# Patient Record
Sex: MALE | Race: WHITE | NOT HISPANIC OR LATINO | Employment: FULL TIME | URBAN - METROPOLITAN AREA
[De-identification: names, ages, dates, MRNs, and addresses within clinical notes are randomized per-mention and may not be internally consistent; named-entity substitution may affect disease eponyms.]

---

## 2019-01-02 ENCOUNTER — TRANSCRIBE ORDERS (OUTPATIENT)
Dept: RADIOLOGY | Facility: HOSPITAL | Age: 49
End: 2019-01-02

## 2019-01-02 ENCOUNTER — HOSPITAL ENCOUNTER (OUTPATIENT)
Dept: RADIOLOGY | Facility: HOSPITAL | Age: 49
Discharge: HOME | End: 2019-01-02
Attending: INTERNAL MEDICINE
Payer: COMMERCIAL

## 2019-01-02 ENCOUNTER — TELEPHONE (OUTPATIENT)
Dept: PHYSICAL THERAPY | Facility: OTHER | Age: 49
End: 2019-01-02

## 2019-01-02 ENCOUNTER — DOCUMENTATION (OUTPATIENT)
Dept: PHYSICAL THERAPY | Facility: OTHER | Age: 49
End: 2019-01-02

## 2019-01-02 ENCOUNTER — OFFICE VISIT (OUTPATIENT)
Dept: INTERNAL MEDICINE CLINIC | Facility: CLINIC | Age: 49
End: 2019-01-02
Payer: COMMERCIAL

## 2019-01-02 VITALS
HEIGHT: 74 IN | DIASTOLIC BLOOD PRESSURE: 86 MMHG | BODY MASS INDEX: 30.29 KG/M2 | HEART RATE: 88 BPM | OXYGEN SATURATION: 98 % | WEIGHT: 236 LBS | TEMPERATURE: 98.6 F | SYSTOLIC BLOOD PRESSURE: 122 MMHG

## 2019-01-02 DIAGNOSIS — Z23 NEED FOR TETANUS, DIPHTHERIA, AND ACELLULAR PERTUSSIS (TDAP) VACCINE: Primary | ICD-10-CM

## 2019-01-02 DIAGNOSIS — M54.41 ACUTE BILATERAL LOW BACK PAIN WITH BILATERAL SCIATICA: ICD-10-CM

## 2019-01-02 DIAGNOSIS — M54.42 ACUTE BILATERAL LOW BACK PAIN WITH BILATERAL SCIATICA: ICD-10-CM

## 2019-01-02 DIAGNOSIS — Z23 IMMUNIZATION DUE: ICD-10-CM

## 2019-01-02 PROBLEM — M54.40 ACUTE BILATERAL LOW BACK PAIN WITH SCIATICA: Status: ACTIVE | Noted: 2019-01-02

## 2019-01-02 PROCEDURE — 99213 OFFICE O/P EST LOW 20 MIN: CPT | Performed by: INTERNAL MEDICINE

## 2019-01-02 PROCEDURE — 1036F TOBACCO NON-USER: CPT | Performed by: INTERNAL MEDICINE

## 2019-01-02 PROCEDURE — 90471 IMMUNIZATION ADMIN: CPT

## 2019-01-02 PROCEDURE — 3008F BODY MASS INDEX DOCD: CPT | Performed by: INTERNAL MEDICINE

## 2019-01-02 PROCEDURE — 90715 TDAP VACCINE 7 YRS/> IM: CPT

## 2019-01-02 PROCEDURE — 72110 X-RAY EXAM L-2 SPINE 4/>VWS: CPT

## 2019-01-02 RX ORDER — GAUZE BANDAGE 4" X 4"
1 BANDAGE TOPICAL EVERY OTHER DAY
COMMUNITY

## 2019-01-02 RX ORDER — TRAMADOL HYDROCHLORIDE 50 MG/1
50 TABLET ORAL EVERY 8 HOURS PRN
Qty: 30 TABLET | Refills: 0 | Status: SHIPPED | OUTPATIENT
Start: 2019-01-02

## 2019-01-02 RX ORDER — ASPIRIN 325 MG
325 TABLET ORAL AS NEEDED
COMMUNITY

## 2019-01-02 RX ORDER — MULTIVITAMIN
1 TABLET ORAL DAILY
COMMUNITY

## 2019-01-02 RX ORDER — IBUPROFEN 200 MG
200 TABLET ORAL EVERY 6 HOURS PRN
COMMUNITY

## 2019-01-02 RX ORDER — OMEPRAZOLE 20 MG/1
20 CAPSULE, DELAYED RELEASE ORAL DAILY
Qty: 30 CAPSULE | Refills: 0 | Status: SHIPPED | OUTPATIENT
Start: 2019-01-02

## 2019-01-02 RX ORDER — CYCLOBENZAPRINE HCL 5 MG
5 TABLET ORAL 3 TIMES DAILY PRN
Qty: 30 TABLET | Refills: 0 | Status: SHIPPED | OUTPATIENT
Start: 2019-01-02

## 2019-01-02 NOTE — PROGRESS NOTES
Assessment/Plan:    Acute bilateral low back pain with sciatica  Plan will be to treat with cyclobenzaprine 5 mg every 8 hr as needed for muscle spasms as well as could continuation of NSAIDs however decreasing dose of ibuprofen to 800 mg every 6 hr for mild-to-moderate pain  Will also prescribe tramadol 50 mg every 8 hr for moderate to severe pain in  Will prescribe omeprazole 20 mg once a day while on high-dose ibuprofen/NSAIDs  Will get an x-ray of the lumbar back for further evaluation  Will also refer to comprehensive spine program     Immunization due  TDaP administered today  Diagnoses and all orders for this visit:    Need for tetanus, diphtheria, and acellular pertussis (Tdap) vaccine  -     TDAP VACCINE GREATER THAN OR EQUAL TO 8YO IM    Acute bilateral low back pain with bilateral sciatica  -     XR spine lumbar minimum 4 views non injury; Future  -     Ambulatory Referral to Comprehensive Spine Program; Future  -     Ambulatory Referral to Massage Therapist; Future  -     cyclobenzaprine (FLEXERIL) 5 mg tablet; Take 1 tablet (5 mg total) by mouth 3 (three) times a day as needed for muscle spasms Take 1  -     traMADol (ULTRAM) 50 mg tablet; Take 1 tablet (50 mg total) by mouth every 8 (eight) hours as needed for moderate pain or severe pain  -     omeprazole (PriLOSEC) 20 mg delayed release capsule; Take 1 capsule (20 mg total) by mouth daily    Immunization due    Other orders  -     aspirin 325 mg tablet; Take 325 mg by mouth as needed for mild pain  -     ibuprofen (MOTRIN) 200 mg tablet; Take 200 mg by mouth every 6 (six) hours as needed for mild pain  -     Multiple Vitamin (MULTIVITAMIN) tablet; Take 1 tablet by mouth daily  -     KRILL OIL PO; Take 1 capsule by mouth every other day  -     co-enzyme Q-10 30 MG capsule; Take 30 mg by mouth daily  -     b complex vitamins tablet; Take 1 tablet by mouth daily  -     Omega-3 Fatty Acids (FISH OIL) 435 MG CAPS;  Take 1 capsule by mouth every other day          Subjective:      Patient ID: Lokesh Ferro is a 46 y o  male  46year old male is seen today with acute complaint of low back pain 1-week ago  He hurt his back while helping a friend insulate his attic, bending over for prolonged period of time  He has hurt his back in the past with shoveling, denies any malalignment or fracture although vaguely recalls being told he had a disc herniation then  He has been taking high dose Ibuprofen for pain with mild relief  He has been taking 800-1000 mg once a day of ibuprofen, this morning took 1200 mg  He does state that he has been having a difficult time with ambulation since injury  He is requesting TDaP immunization as he has a new born child  Last TDaP longer than 10-years ago  Back Pain   This is a new problem  The current episode started in the past 7 days  The problem occurs constantly  The problem is unchanged  The pain is present in the lumbar spine  The quality of the pain is described as shooting, stabbing and burning  The pain radiates to the left thigh  The pain is severe  The pain is the same all the time  The symptoms are aggravated by bending, position and twisting  Stiffness is present all day  Pertinent negatives include no abdominal pain, bladder incontinence, bowel incontinence, chest pain, dysuria, fever, headaches, leg pain, numbness, paresis, paresthesias, pelvic pain, perianal numbness, tingling, weakness or weight loss  He has tried NSAIDs for the symptoms  The treatment provided mild relief  The following portions of the patient's history were reviewed and updated as appropriate: allergies, current medications, past family history, past medical history, past social history, past surgical history and problem list     Review of Systems   Constitutional: Negative for activity change, appetite change, chills, diaphoresis, fatigue, fever and weight loss     HENT: Negative for congestion, postnasal drip, rhinorrhea, sinus pain, sinus pressure, sneezing and sore throat  Eyes: Negative for visual disturbance  Respiratory: Negative for apnea, cough, choking, chest tightness, shortness of breath and wheezing  Cardiovascular: Negative for chest pain, palpitations and leg swelling  Gastrointestinal: Negative for abdominal distention, abdominal pain, anal bleeding, blood in stool, bowel incontinence, constipation, diarrhea, nausea and vomiting  Endocrine: Negative for cold intolerance and heat intolerance  Genitourinary: Negative for bladder incontinence, difficulty urinating, dysuria, hematuria and pelvic pain  Musculoskeletal: Positive for back pain  Skin: Negative  Neurological: Negative for dizziness, tingling, weakness, light-headedness, numbness, headaches and paresthesias  Hematological: Negative for adenopathy  Psychiatric/Behavioral: Negative for agitation, sleep disturbance and suicidal ideas  All other systems reviewed and are negative  History reviewed  No pertinent past medical history        Current Outpatient Prescriptions:     aspirin 325 mg tablet, Take 325 mg by mouth as needed for mild pain, Disp: , Rfl:     b complex vitamins tablet, Take 1 tablet by mouth daily, Disp: , Rfl:     co-enzyme Q-10 30 MG capsule, Take 30 mg by mouth daily, Disp: , Rfl:     ibuprofen (MOTRIN) 200 mg tablet, Take 200 mg by mouth every 6 (six) hours as needed for mild pain, Disp: , Rfl:     KRILL OIL PO, Take 1 capsule by mouth every other day, Disp: , Rfl:     Multiple Vitamin (MULTIVITAMIN) tablet, Take 1 tablet by mouth daily, Disp: , Rfl:     Omega-3 Fatty Acids (FISH OIL) 435 MG CAPS, Take 1 capsule by mouth every other day, Disp: , Rfl:     cyclobenzaprine (FLEXERIL) 5 mg tablet, Take 1 tablet (5 mg total) by mouth 3 (three) times a day as needed for muscle spasms Take 1, Disp: 30 tablet, Rfl: 0    omeprazole (PriLOSEC) 20 mg delayed release capsule, Take 1 capsule (20 mg total) by mouth daily, Disp: 30 capsule, Rfl: 0    traMADol (ULTRAM) 50 mg tablet, Take 1 tablet (50 mg total) by mouth every 8 (eight) hours as needed for moderate pain or severe pain, Disp: 30 tablet, Rfl: 0    No Known Allergies    Social History   History reviewed  No pertinent surgical history  History reviewed  No pertinent family history  Objective:  /86 (BP Location: Left arm, Patient Position: Sitting, Cuff Size: Large)   Pulse 88   Temp 98 6 °F (37 °C) (Oral)   Ht 6' 2" (1 88 m) Comment: with shoes on  Wt 107 kg (236 lb) Comment: with shoes on  SpO2 98% Comment: room air  BMI 30 30 kg/m²     No results found for this or any previous visit (from the past 1344 hour(s))  Physical Exam   Constitutional: He is oriented to person, place, and time  He appears well-developed and well-nourished  No distress  HENT:   Head: Normocephalic and atraumatic  Eyes: Pupils are equal, round, and reactive to light  Conjunctivae and EOM are normal  Right eye exhibits no discharge  Left eye exhibits no discharge  No scleral icterus  Neck: Normal range of motion  Neck supple  No JVD present  No thyromegaly present  Cardiovascular: Normal rate, regular rhythm, normal heart sounds and intact distal pulses  Exam reveals no gallop and no friction rub  No murmur heard  Pulmonary/Chest: Effort normal and breath sounds normal  No respiratory distress  He has no wheezes  He has no rales  He exhibits no tenderness  Abdominal: Soft  Bowel sounds are normal  He exhibits no distension and no mass  There is no tenderness  There is no rebound and no guarding  Musculoskeletal: He exhibits no edema or deformity  Lumbar back: He exhibits decreased range of motion, tenderness, bony tenderness, pain and spasm  He exhibits no swelling, no edema, no deformity, no laceration and normal pulse  Lymphadenopathy:     He has no cervical adenopathy  Neurological: He is alert and oriented to person, place, and time   He has normal reflexes  No cranial nerve deficit  Coordination normal    Skin: Skin is warm and dry  No rash noted  He is not diaphoretic  No erythema  No pallor  Psychiatric: He has a normal mood and affect  His behavior is normal  Judgment and thought content normal    Nursing note and vitals reviewed

## 2019-01-02 NOTE — TELEPHONE ENCOUNTER
Patient called in with regard to multiple referrals placed by PCP  Patient was not sure where he was supposed to be to for further care  Patient had multiple referrals placed with conflicting information  Era Arciniega RN had been in communication with PCP about referrals and was currently on another call  Patient requesting call from colleague  Patient states he lives half the week in [place] and half the week in [place]  Patient advised to contact PCP as well for clarification as he stated he thought he was being seen by pain management or a specialist, and did not want PT as he has done it multiple times

## 2019-01-02 NOTE — PROGRESS NOTES
This nurse did in-basket the provider for further clarification of referrals that were  placed for this Pt  Referral was closed  Waiting for clarification from provider

## 2019-01-02 NOTE — ASSESSMENT & PLAN NOTE
Plan will be to treat with cyclobenzaprine 5 mg every 8 hr as needed for muscle spasms as well as could continuation of NSAIDs however decreasing dose of ibuprofen to 800 mg every 6 hr for mild-to-moderate pain  Will also prescribe tramadol 50 mg every 8 hr for moderate to severe pain in  Will prescribe omeprazole 20 mg once a day while on high-dose ibuprofen/NSAIDs  Will get an x-ray of the lumbar back for further evaluation    Will also refer to comprehensive spine program

## 2019-01-02 NOTE — PROGRESS NOTES
This R N  did receive clarification from Dr Tawnya Rodney [facility]), that he will be correcting the referrals that were placed incorrectly   This nurse did provide the name of pain management doctor that is in the [place] office to Dr Tawnya Rodney, and was notified by Dr Tawnya Rodney that he is placing referral for Pt  see massage therapist

## 2019-01-03 ENCOUNTER — DOCUMENTATION (OUTPATIENT)
Dept: PHYSICAL THERAPY | Facility: OTHER | Age: 49
End: 2019-01-03

## 2019-01-03 NOTE — PROGRESS NOTES
R N  did attempt to reach Pt  and left v m  With our phone number and our hours   Waiting for return phone call

## 2019-01-09 ENCOUNTER — TELEPHONE (OUTPATIENT)
Dept: INTERNAL MEDICINE CLINIC | Facility: CLINIC | Age: 49
End: 2019-01-09

## 2019-01-21 ENCOUNTER — TELEPHONE (OUTPATIENT)
Dept: OTHER | Facility: HOSPITAL | Age: 49
End: 2019-01-21

## 2020-07-17 ENCOUNTER — APPOINTMENT (OUTPATIENT)
Dept: RADIOLOGY | Facility: AMBULARY SURGERY CENTER | Age: 50
End: 2020-07-17
Attending: ORTHOPAEDIC SURGERY
Payer: COMMERCIAL

## 2020-07-17 VITALS — BODY MASS INDEX: 30.3 KG/M2 | HEIGHT: 74 IN

## 2020-07-17 DIAGNOSIS — M25.572 PAIN, JOINT, ANKLE AND FOOT, LEFT: ICD-10-CM

## 2020-07-17 DIAGNOSIS — S93.492A SPRAIN OF ANTERIOR TALOFIBULAR LIGAMENT OF LEFT ANKLE, INITIAL ENCOUNTER: Primary | ICD-10-CM

## 2020-07-17 PROCEDURE — 1036F TOBACCO NON-USER: CPT | Performed by: ORTHOPAEDIC SURGERY

## 2020-07-17 PROCEDURE — 99203 OFFICE O/P NEW LOW 30 MIN: CPT | Performed by: ORTHOPAEDIC SURGERY

## 2020-07-17 PROCEDURE — 73610 X-RAY EXAM OF ANKLE: CPT

## 2020-07-17 NOTE — TELEPHONE ENCOUNTER
We want him out of the boot into a supportive sneaker by Sunday 7/26  He may wean out of it sooner than that if he starts feeling better with PT, RICE, and activity modification

## 2020-07-17 NOTE — TELEPHONE ENCOUNTER
Spoke to patient, advised that he needs to be wearing the boot anytime he is weight bearing for now but should be weaned out of it by 7/26  Advised compression with an ACE bandage the same way  Advised ice 20 mins on 20 mins off and elevation as well  Advised that he may remove the boot and wrap to sleep  Patient verbalized understanding  He wanted me to let you know that he feels much better with compression of the ace bandage and boot application   He thanks you

## 2020-07-17 NOTE — TELEPHONE ENCOUNTER
Patient is calling to find out how long he is supposed to wear his "space boot"  Patient is stating that it is unclear just that he is only to wear it for 10 days  Patient would like someone to call him back today so he knows how he is suppose to wear this for the weekend       [phone]

## 2020-07-17 NOTE — PATIENT INSTRUCTIONS
You have sprained your ankle  Over the next 4 weeks it is important you follow these instructions; Lateral Ankle Sprain Protocol - [facility]  Acute Phase: Days 1-3  Goals: Decrease pain and swelling, protect from further injury  · Pain and swelling management (RICE)  · Protection of injured ligaments (taping, splints, casting, walking boot etc )  · Gait-WBAT  Sub-Acute Phase: 2-4 days to 2 weeks  Goals: Decrease/eliminate pain, increase ROM, decrease swelling, increase strength  · Continue pain and swelling management  · Subtalar and talocrurcal joint mobilizations  · ROM with pain-free range: DF/PF/EV/IV AROM, calf stretching  · Increase weight bearing of affected extremity during gait  · Isometric strengthening  Rehabilitative Phase: 2-6 weeks  Goals: Regain ROM and strength, increase endurance and proprioception  · Continue joint mobilizations and stretching  · Progress to pain-free concentric and eccentric strengthening exercises (both open chain and closed chain)  · Proprioception exercises (balance board, BAPS board, single leg stance etc )  · Gait training-promote equal weight beraing and weaning of assistive devices  · Endurance activities (stationary biking, swimming, walking, etc )  Functional Phase: 6 weeks  Goals: Return to full activity and function  · Continue strengthening exercises  · Coordination and agility training-depends on patient's prior level of function, recreational activities, and goals         Treating your Sprained Ankle  Treating your sprained ankle properly may prevent chronic pain and instability  For a Grade I sprain, follow the R I C E  guidelines:    · Rest your ankle by not walking on it  Limit weight bearing  Use crutches if necessary; if there is no fracture you are safe to put some weight on the leg  An ankle brace often helps control swelling and adds stability while the ligaments are healing  · Ice it to keep down the swelling   Don't put ice directly on the skin (use a thin piece of cloth such as a pillow case between the ice bag and the skin) and don't ice more than 20 minutes at a time to avoid frost bite  · Compression can help control swelling as well as immobilize and support your injury  · Elevate the foot by reclining and propping it up above the waist or heart as needed     Swelling usually goes down with a few days  For a Grade II sprain, follow the R I C E  guidelines and allow more time for healing  A doctor may immobilize or splint your sprained ankle  A Grade III sprain puts you at risk for permanent ankle instability  Rarely, surgery may be needed to repair the damage, especially in competitive athletes  For severe ankle sprains, your doctor may also consider treating you with a short leg cast for two to three weeks or a walking boot  People who sprain their ankle repeatedly may also need surgical repair to tighten their ligaments  Rehabilitating your Sprained Ankle  Every ligament injury needs rehabilitation  Otherwise, your sprained ankle might not heal completely and you might re-injure it  All ankle sprains, from mild to severe, require three phases of recovery:    · Phase I includes resting, protecting and reducing swelling of your injured ankle  · Phase II includes restoring your ankle's flexibility, range of motion and strength  · Phase III includes gradually returning to straight-ahead activity and doing maintenance exercises, followed later by more cutting sports such as tennis, basketball or football    Once you can stand on your ankle again, your doctor will prescribe exercise routines to strengthen your muscles and ligaments and increase your flexibility, balance and coordination  Later, you may walk, jog and run figure eights with your ankle taped or in a supportive ankle brace  It's important to complete the rehabilitation program because it makes it less likely that you'll hurt the same ankle again   If you don't complete rehabilitation, you could suffer chronic pain, instability and arthritis in your ankle  If your ankle still hurts, it could mean that the sprained ligament has not healed right, or that some other injury also happened  To prevent future sprained ankles, pay attention to your body's warning signs to slow down when you feel pain or fatigue, and stay in shape with good muscle balance, flexibility and strength in your soft tissues  Ankle Sprain     What is an ankle sprain? An ankle sprain refers to tearing of the ligaments of the ankle  The most common ankle sprain occurs on the lateral or outside part of the ankle  This is an extremely common injury which affects many people during a wide variety of activities  It can happen in the setting of an ankle fracture (i e  when the bones of the ankle also break)  Most commonly, however, it occurs in isolation  What are the symptoms an ankle sprain? Patients report pain after having twisted an ankle  This usually occurs due to an inversion injury, which means the foot rolls underneath the ankle or leg  It commonly occurs during sports  Patients will complain of pain on the outside of their ankle and various degrees of swelling and bleeding under the skin (i e  bruising)  Technically, this bruising is referred to as ecchymosis  Depending on the severity of the sprain, a person may or may not be able to put weight on the foot     What are the risk factors for an ankle sprain? As noted above, these injuries occur when the ankle is twisted underneath the leg, called inversion  Risk factors are those activities, such as basketball and jumping sports, in which an athlete can come down on and turn the ankle or step on an opponent's foot      Some people are predisposed to ankle sprains  In people with a hindfoot varus, which means that the general nature or posture of the heels is slightly turned toward the inside, these injuries are more common   This is because it is easier to turn on the ankle      In those who have had a severe sprain in the past, it is also easier to turn the ankle and cause a new sprain  Therefore, one of the risk factors of spraining the ankle is having instability  Those who have weak muscles, especially those called the peroneals which run along the outside of the ankle, may be more predisposed    Anatomy    There are multiple ligaments in the ankle  Ligaments in general are those structures that connect bone-to-bone  Tendons, on the other hand, connect muscle-to-bone and allow those muscles to exert their force  In the case of an ankle sprain, there are several commonly sprained ligaments  The two most important are the following:            ?  ????       1  The ATFL or anterior talofibular ligament, which connects the talus to the fibula on the outside of the ankle       2  The CFL or calcaneal fibular ligament, which connects the fibula to the calcaneus below        3  Finally, there is a third ligament which is not as commonly torn  It runs more in the back of the ankle and is called the PTFL or posterior talofibular ligament  These must be differentiated from the so-called high ankle sprain ligaments, which are completely different and located higher up the leg  How is an ankle sprain diagnosed? Ankle sprains can be diagnosed fairly easily given that they are common injuries  The location of pain on the outside of the ankle with tenderness and swelling in a patient who has an ankle with inversion is very suggestive  In these patients, normal X-rays also suggest that the bone has not been broken and instead the ankle ligaments have been torn or sprained      It is very important, however, not to simply regard any injury as an ankle sprain because other injuries can occur as well  For example, the peroneal tendons mentioned above can be torn   There can also be fractures in other bones around the ankle including the fifth metatarsal and the anterior process of the calcaneus  In very severe cases, an MRI may be warranted to rule out other problems in the ankle such as damage to the cartilage  An MRI typically is not necessary to diagnose a sprain     What are treatment options? Surgery is not required in the vast majority of ankle sprains  Even in severe sprains, these ligaments will heal without surgery  The grade of the sprain will dictate treatment  Sprains are traditionally classified into several grades  Perhaps more important, however, is the patients ability to bear weight  Those that can bear weight even after the injury are likely to return very quickly to play  Those who cannot walk may need to be immobilized      In general, treatment in the first 48 to 72 hours consists of resting the ankle, icing 20 minutes every two to three hours, compressing with an ACE wrap, and elevating, which means positioning the leg and ankle so that the toes are above the level of patients nose  Those patients who cannot bear weight are better treated in a removable walking boot until they can comfortably bear weight      Physical therapy is a mainstay  Patients should learn to strengthen the muscles around the ankle, particularly the peroneals  An ankle brace can be used in an athlete until a therapist believes that the ankle is strong enough to return to play without it  Surgery is rarely indicated but may be needed in a patient who has cartilage damage or other related injuries  Ligaments are only repaired or strengthened in cases of chronic instability in which the ligaments have healed but not in a strong fashion  How long is recovery? Recovery depends on the severity of the injury  As noted above, for those minor injuries, people can return to their activities in sports within several days  For very severe sprains, it may take longer and up to several weeks   It should be noted that high ankle sprains take considerably longer to heal      Outcomes for ankle sprains are generally quite good  Most patients heal from an ankle sprain and are able to get back to their normal lives, sports and activities  Some people, however, who do not properly rehab their ankle and have a rather severe sprain may go on to have ankle instability  Chronic instability occurs in patients repeatedly spraining the ankle  Such repeated episodes can be dangerous because they can lead to damage within the ankle  These patients should be identified and considered for repair     Potential Complications    Surgery is rarely needed  As noted above, however, an improperly rehabbed ankle may end up having chronic instability  It is important to address this with either therapy or surgery before further damage occurs to the ankle  Frequently Asked Questions    What is a high ankle sprain and is that different from a regular ankle sprain? A high ankle sprain refers to tearing of the ligaments that connect the tibia to the fibula (this connection is also called the syndesmosis)  These are different and much less common than the standard lateral ankle sprains, meaning those that occur on the side of the ankle       Do ankle sprains ever need to be repaired acutely? Ankle sprains rarely, if ever, needed to be treated with surgery  The vast majority simply need to be treated with rest, ice, compression and elevation followed by physical therapy and temporary bracing       I have sprained my ankle many times  Should I be concerned? Yes  The more you sprain an ankle, the greater the chance that problems will develop  For example, turning the ankle can lead to damage to the cartilage inside the ankle joint  You should see your doctor if this is occurring

## 2020-07-17 NOTE — PROGRESS NOTES
TENA Salas  Attending, Orthopaedic Surgery  [facility] [address] [address] [facility]      ORTHOPAEDIC FOOT AND ANKLE CLINIC VISIT     Assessment:     Encounter Diagnosis   Name Primary?  Sprain of anterior talofibular ligament of left ankle, initial encounter Yes            Plan:   · The patient verbalized understanding of exam findings and treatment plan  We engaged in the shared decision-making process and treatment options were discussed at length with the patient  Surgical and conservative management discussed today along with risks and benefits  · Will pursue conservative treatment with [facility] for help with symptomatic relief and physical therapy for strengthening and ROM ankle exercises  · Return in about 7 weeks (around 9/4/2020)  History of Present Illness:   Chief Complaint:   Chief Complaint   Patient presents with    Left Ankle - Pain     Belén Ramesh is a 44 y o  male who is being seen for left ankle pain  Patient notes going down the stairs and twisting his ankle with an inversion moment, falling down the stairs and feeling pain  Pain is localized at lateral ankle with minimal radiating and described as sharp and severe  He has increased swelling and ecchymoses  He has sprained his ankle multiple times in the past but feels this is the worst  He has been walking but with increased difficulty  Patient denies numbness, tingling or radicular pain  Pain/symptom timing:  Worse during the day when active  Pain/symptom context:  Worse with activites and work  Pain/symptom modifying factors:  Rest makes better, activities make worse  Pain/symptom associated signs/symptoms: none    Prior treatment   · NSAIDsYes   · Injections No   · Bracing/Orthotics No    · Physical Therapy No     Orthopedic Surgical History:   See Below    Past Medical, Surgical and Social History:  Past Medical History:  has no past medical history on file    Problem List: does not have any pertinent problems on file  Past Surgical History:  has no past surgical history on file  Family History: family history is not on file  Social History:  reports that he has never smoked  He has never used smokeless tobacco  He reports that he drinks about 1 0 standard drinks of alcohol per week  He reports that he does not use drugs  Current Medications: has a current medication list which includes the following prescription(s): aspirin, b complex vitamins, co-enzyme q-10, cyclobenzaprine, ibuprofen, krill oil, multivitamin, fish oil, omeprazole, and tramadol  Allergies: has No Known Allergies  Review of Systems:  General- denies fever/chills  HEENT- denies hearing loss or sore throat  Eyes- denies eye pain or visual disturbances, denies red eyes  Respiratory- denies cough or SOB  Cardio- denies chest pain or palpitations  GI- denies abdominal pain  Endocrine- denies urinary frequency  Urinary- denies pain with urination  Musculoskeletal- Negative except noted above  Skin- denies rashes or wounds  Neurological- denies dizziness or headache  Psychiatric- denies anxiety or difficulty concentrating    Physical Exam:   Ht 6' 2" (1 88 m)   BMI 30 30 kg/m²   General/Constitutional: No apparent distress: well-nourished and well developed  Eyes: normal ocular motion  Cardio: RRR, Normal S1S2, No m/r/g  Lymphatic: No appreciable lymphadenopathy  Respiratory: Non-labored breathing, CTA b/l no w/c/r  Vascular: No edema, swelling or tenderness, except as noted in detailed exam   Integumentary: No impressive skin lesions present, except as noted in detailed exam   Neuro: No ataxia or tremors noted  Psych: Normal mood and affect, oriented to person, place and time  Appropriate affect  Musculoskeletal: Normal, except as noted in detailed exam and in HPI      Examination    Left    Gait Antalgic   Musculoskeletal Tender to palpation at lateral ankle    Skin Moderate swelling with ecchymoses    Nails Normal    Range of Motion Normal degrees dorsiflexion, Normal degrees plantarflexion  Subtalar motion: Normal    Stability Stable    Muscle Strength 5/5 tibialis anterior  5/5 gastrocnemius-soleus  5/5 posterior tibialis  4+/5 peroneal/eversion strength  5/5 EHL  5/5 FHL    Neurologic Normal    Sensation Intact to light touch throughout sural, saphenous, superficial peroneal, deep peroneal and medial/lateral plantar nerve distributions  [place] 5 07 filament (10g) testing deferred  Cardiovascular Brisk capillary refill < 2 seconds,intact DP and PT pulses    Special Tests Negative anterior drawer test      Imaging Studies:   3 views of the ankle were taken, reviewed and interpreted independently that demonstrate no acute fractures or dislocations  Reviewed by me personally  Greggory Dutch Lachman, MD  Foot & Ankle Surgery   Department of [address] [place] [address]      I personally performed the service  Greggory Dutch Lachman, MD

## 2020-07-20 DIAGNOSIS — S99.921A INJURY OF RIGHT FOOT, INITIAL ENCOUNTER: Primary | ICD-10-CM

## 2020-07-20 NOTE — TELEPHONE ENCOUNTER
Patient is calling in requesting a call back  He stated he fell today again  He would like to know if  can send an order for a Cane  He stated he was using a friend's cane over the weekend and it was very helpful   Please advise, thank you

## 2020-07-20 NOTE — TELEPHONE ENCOUNTER
Patient requesting the script be mailed to him where he is staying currently  Address: [place], [address]    Please advise

## 2020-07-20 NOTE — TELEPHONE ENCOUNTER
Patient is calling in again stating he does not want the quad point cane  He wants a simple regular cane  He would like to know if dr is recommending this type of cane?  Please advise, thank you

## 2020-07-21 NOTE — TELEPHONE ENCOUNTER
Spoke with patient and he wanted to let Dr Leighann Mcbride know that the Wide Quad type cane will not work in his old home size door ways or at work due to size  He could use the small fist like quad cane or the single point as stated above    Would you be willing to change the RX to accommodate this patient's request

## 2020-07-21 NOTE — TELEPHONE ENCOUNTER
Patient sees Dr Juancarlos Olmstead at [facility] requesting work notes from 7/17, also requesting Script for cane to be faxed to [phone],  Faxed confirmed

## 2020-07-23 DIAGNOSIS — S93.492A SPRAIN OF ANTERIOR TALOFIBULAR LIGAMENT OF LEFT ANKLE, INITIAL ENCOUNTER: Primary | ICD-10-CM

## 2020-07-23 NOTE — TELEPHONE ENCOUNTER
Patient would like script faxed to [address] [place] [address]  He will call in later today with the fax number information or have [facility] reach out directly

## 2021-02-05 ENCOUNTER — VBI (OUTPATIENT)
Dept: ADMINISTRATIVE | Facility: OTHER | Age: 51
End: 2021-02-05

## 2023-06-01 ENCOUNTER — OFFICE VISIT (OUTPATIENT)
Dept: DENTISTRY | Facility: CLINIC | Age: 53
End: 2023-06-01

## 2023-06-01 VITALS — HEART RATE: 85 BPM | SYSTOLIC BLOOD PRESSURE: 124 MMHG | DIASTOLIC BLOOD PRESSURE: 71 MMHG | TEMPERATURE: 98.4 F

## 2023-06-01 DIAGNOSIS — K04.7 DENTAL INFECTION: Primary | ICD-10-CM

## 2023-06-01 RX ORDER — AMOXICILLIN 500 MG/1
500 CAPSULE ORAL EVERY 8 HOURS SCHEDULED
Qty: 21 CAPSULE | Refills: 0 | Status: SHIPPED | OUTPATIENT
Start: 2023-06-01 | End: 2023-06-08

## 2023-06-01 NOTE — PROGRESS NOTES
EMERGENCY EXAM    Kourtney Rivas, 45 y o  male reports to clinic for pain in the LL quadrant  CC:     Pt consents to exam and understands limited scope of today's visit  Significant medical history: reviewed on changes  Allergies: NKDA  LDV: before COVID  ASA: 1    Pain is a 2/10 currently but has been a 10/10 this week  Pain began 7 days ago  Pt is taking aspirin for pain  Exam/findings:  - #17 and #18 large caries with buccal swelling    Radiographic findings:   - #17 and #18 large caries near and into the pulp   - #18 long skinny curved root    Discussed:  - need for ext with OS for both 17 and 18  - need for antibiotics for infection   - advised alternating between advil and tylenol   - advised comp fmx     Treatment:  - limited and PA  - amox rx 500mg TID 7 days   - referral to OS given for ext #17 and 18    Pt left satisfied and ambulatory       NV: comp fmx if pt wants

## 2024-09-26 ENCOUNTER — OCCMED (OUTPATIENT)
Dept: URGENT CARE | Facility: CLINIC | Age: 54
End: 2024-09-26
Payer: OTHER MISCELLANEOUS

## 2024-09-26 DIAGNOSIS — S29.012A STRAIN OF THORACIC BACK REGION: ICD-10-CM

## 2024-09-26 DIAGNOSIS — S39.012A STRAIN OF MUSCLE, FASCIA AND TENDON OF LOWER BACK, INITIAL ENCOUNTER: Primary | ICD-10-CM

## 2024-09-26 PROCEDURE — G0383 LEV 4 HOSP TYPE B ED VISIT: HCPCS | Performed by: PHYSICIAN ASSISTANT

## 2024-09-26 PROCEDURE — 99284 EMERGENCY DEPT VISIT MOD MDM: CPT | Performed by: PHYSICIAN ASSISTANT
